# Patient Record
Sex: FEMALE | Race: BLACK OR AFRICAN AMERICAN | URBAN - METROPOLITAN AREA
[De-identification: names, ages, dates, MRNs, and addresses within clinical notes are randomized per-mention and may not be internally consistent; named-entity substitution may affect disease eponyms.]

---

## 2023-05-19 ENCOUNTER — OFFICE VISIT (OUTPATIENT)
Dept: URBAN - METROPOLITAN AREA CLINIC 84 | Facility: CLINIC | Age: 63
End: 2023-05-19
Payer: COMMERCIAL

## 2023-05-19 ENCOUNTER — DASHBOARD ENCOUNTERS (OUTPATIENT)
Age: 63
End: 2023-05-19

## 2023-05-19 ENCOUNTER — WEB ENCOUNTER (OUTPATIENT)
Dept: URBAN - METROPOLITAN AREA CLINIC 84 | Facility: CLINIC | Age: 63
End: 2023-05-19

## 2023-05-19 VITALS
WEIGHT: 202 LBS | HEIGHT: 67 IN | DIASTOLIC BLOOD PRESSURE: 72 MMHG | BODY MASS INDEX: 31.71 KG/M2 | TEMPERATURE: 97.5 F | HEART RATE: 77 BPM | SYSTOLIC BLOOD PRESSURE: 113 MMHG

## 2023-05-19 DIAGNOSIS — K21.9 GERD: ICD-10-CM

## 2023-05-19 DIAGNOSIS — R13.14 PHARYNGOESOPHAGEAL DYSPHAGIA: ICD-10-CM

## 2023-05-19 PROCEDURE — 99204 OFFICE O/P NEW MOD 45 MIN: CPT | Performed by: INTERNAL MEDICINE

## 2023-05-19 NOTE — PHYSICAL EXAM CHEST:
no lesions, no deformities, no traumatic injuries, no significant scars are present, chest wall non-tender, no masses present, breathing is unlabored without accessory muscle use,normal breath sounds
1-2 days: Independent in ambulation with use of rolling walker device up to 200 feet observing proper gait pattern, posture and use of walking device safely.

## 2023-05-19 NOTE — HPI-TODAY'S VISIT:
This patient was referred by [facility] for an evaluation of GI complaints.  A copy of this will be sent to the referring provider.  When she eats she brings up her food.  She regurgitates her food.  She denies N/V.  She has dysphagia to solids for about 2 years.  She has increased supine symptoms.  She ahs lost a lot of weight.  She has heartburn.  She's on Omeprazole with only mild relief.  She denies abdominal pain.  She denies early satiety.  She was diagnosed with DM but this is diet controlled.  She denies LGI symptoms.  She denies LGI bleed or melena.  She denies NSAID's or alcohol.  She has not had prior EGD.  Per the patient she had normal Cologuard

## 2023-06-05 ENCOUNTER — OFFICE VISIT (OUTPATIENT)
Dept: URBAN - METROPOLITAN AREA SURGERY CENTER 20 | Facility: SURGERY CENTER | Age: 63
End: 2023-06-05